# Patient Record
Sex: FEMALE
[De-identification: names, ages, dates, MRNs, and addresses within clinical notes are randomized per-mention and may not be internally consistent; named-entity substitution may affect disease eponyms.]

---

## 2024-06-13 ENCOUNTER — NON-APPOINTMENT (OUTPATIENT)
Age: 64
End: 2024-06-13

## 2024-06-13 ENCOUNTER — APPOINTMENT (OUTPATIENT)
Dept: PULMONOLOGY | Facility: CLINIC | Age: 64
End: 2024-06-13
Payer: MEDICAID

## 2024-06-13 VITALS
SYSTOLIC BLOOD PRESSURE: 120 MMHG | WEIGHT: 142 LBS | OXYGEN SATURATION: 93 % | HEART RATE: 65 BPM | HEIGHT: 62 IN | BODY MASS INDEX: 26.13 KG/M2 | DIASTOLIC BLOOD PRESSURE: 70 MMHG

## 2024-06-13 DIAGNOSIS — J44.1 CHRONIC OBSTRUCTIVE PULMONARY DISEASE WITH (ACUTE) EXACERBATION: ICD-10-CM

## 2024-06-13 DIAGNOSIS — J45.901 CHRONIC OBSTRUCTIVE PULMONARY DISEASE WITH (ACUTE) EXACERBATION: ICD-10-CM

## 2024-06-13 DIAGNOSIS — J45.40 MODERATE PERSISTENT ASTHMA, UNCOMPLICATED: ICD-10-CM

## 2024-06-13 PROBLEM — Z00.00 ENCOUNTER FOR PREVENTIVE HEALTH EXAMINATION: Status: ACTIVE | Noted: 2024-06-13

## 2024-06-13 PROCEDURE — 99204 OFFICE O/P NEW MOD 45 MIN: CPT | Mod: 25

## 2024-06-13 PROCEDURE — G2211 COMPLEX E/M VISIT ADD ON: CPT | Mod: NC,1L

## 2024-06-13 PROCEDURE — 94010 BREATHING CAPACITY TEST: CPT

## 2024-06-13 RX ORDER — FLUTICASONE PROPIONATE AND SALMETEROL 50; 100 UG/1; UG/1
100-50 POWDER RESPIRATORY (INHALATION)
Refills: 0 | Status: ACTIVE | COMMUNITY

## 2024-06-13 RX ORDER — MONTELUKAST SODIUM 10 MG/1
10 TABLET, FILM COATED ORAL
Refills: 0 | Status: ACTIVE | COMMUNITY

## 2024-06-13 RX ORDER — ALBUTEROL 90 MCG
90 AEROSOL (GRAM) INHALATION
Refills: 0 | Status: ACTIVE | COMMUNITY

## 2024-06-13 RX ORDER — BUDESONIDE AND FORMOTEROL FUMARATE DIHYDRATE 160; 4.5 UG/1; UG/1
160-4.5 AEROSOL RESPIRATORY (INHALATION) TWICE DAILY
Qty: 1 | Refills: 4 | Status: ACTIVE | COMMUNITY
Start: 2024-06-13 | End: 1900-01-01

## 2024-06-13 RX ORDER — IPRATROPIUM BROMIDE 0.5 MG/2.5ML
0.02 SOLUTION RESPIRATORY (INHALATION)
Refills: 0 | Status: ACTIVE | COMMUNITY

## 2024-06-13 NOTE — PROCEDURE
[FreeTextEntry1] : In-office spirometry performed, flow-volume loops demonstrate small airways obstruction, spirometry consistent with moderately severe obstruction.

## 2024-06-13 NOTE — HISTORY OF PRESENT ILLNESS
[Never] : never [TextBox_4] : Ms. BRADY ELLSWORTH is a 63 year woman with a medical history significant for ~  ~ presenting today to the clinic for shortness of breath and cough.  Been 10 years of SOB and cough 2010 had allergy and asthma diagnosis Got worse suddenly past 3-4 months Hasn't been able to walk up a full flight of stairs without stopping for breath for over a year. Had a CXR a few weeks ago with her PCP, reports it was normal Breathing and cough are worse first thing in the morning Was recently moved from Advair 100 to OraHealthrt 160, feels like her symptoms are worse on this med Can't walk briskly or hurry.  Occupational Hx: , 2nd hand smoke exposure   Patient relatively hypoxemic today at 93% at rest.

## 2024-06-13 NOTE — REASON FOR VISIT
[Initial] : an initial visit [Cough] : cough [Shortness of Breath] : shortness of breath [Ad Hoc ] : provided by an ad hoc

## 2024-06-13 NOTE — PHYSICAL EXAM
[No Acute Distress] : no acute distress [Normal Oropharynx] : normal oropharynx [Normal Appearance] : normal appearance [No Neck Mass] : no neck mass [Normal Rate/Rhythm] : normal rate/rhythm [Normal S1, S2] : normal s1, s2 [No Murmurs] : no murmurs [No Resp Distress] : no resp distress [No Abnormalities] : no abnormalities [Benign] : benign [Normal Gait] : normal gait [No Clubbing] : no clubbing [No Cyanosis] : no cyanosis [No Edema] : no edema [FROM] : FROM [Normal Color/ Pigmentation] : normal color/ pigmentation [No Focal Deficits] : no focal deficits [Oriented x3] : oriented x3 [Normal Affect] : normal affect [TextBox_68] : diffuse expiratory wheezing

## 2024-06-19 RX ORDER — PREDNISONE 50 MG/1
50 TABLET ORAL
Qty: 5 | Refills: 0 | Status: ACTIVE | COMMUNITY
Start: 2024-06-13 | End: 1900-01-01

## 2024-10-02 ENCOUNTER — APPOINTMENT (OUTPATIENT)
Dept: PULMONOLOGY | Facility: CLINIC | Age: 64
End: 2024-10-02
Payer: MEDICAID

## 2024-10-02 ENCOUNTER — LABORATORY RESULT (OUTPATIENT)
Age: 64
End: 2024-10-02

## 2024-10-02 ENCOUNTER — RESULT REVIEW (OUTPATIENT)
Age: 64
End: 2024-10-02

## 2024-10-02 VITALS
WEIGHT: 140 LBS | HEART RATE: 80 BPM | OXYGEN SATURATION: 95 % | RESPIRATION RATE: 14 BRPM | BODY MASS INDEX: 25.61 KG/M2 | DIASTOLIC BLOOD PRESSURE: 70 MMHG | SYSTOLIC BLOOD PRESSURE: 120 MMHG

## 2024-10-02 DIAGNOSIS — J45.40 MODERATE PERSISTENT ASTHMA, UNCOMPLICATED: ICD-10-CM

## 2024-10-02 PROCEDURE — 99213 OFFICE O/P EST LOW 20 MIN: CPT

## 2024-10-02 NOTE — HISTORY OF PRESENT ILLNESS
[Never] : never [TextBox_4] : Ms. BRADY ELLSWORTH is a 63 year woman with a medical history significant for ~  ~ presenting today to the clinic for shortness of breath and cough.  Been 10 years of SOB and cough 2010 had allergy and asthma diagnosis Got worse suddenly past 3-4 months Hasn't been able to walk up a full flight of stairs without stopping for breath for over a year. Had a CXR a few weeks ago with her PCP, reports it was normal Breathing and cough are worse first thing in the morning Was recently moved from Advair 100 to Symbicort 160, feels like her symptoms are worse on this med Can't walk briskly or hurry.  Occupational Hx: , 2nd hand smoke exposure   Patient relatively hypoxemic today at 93% at rest.  Interval history:  At our last visit, patient was started on prednisone for exacerbation given active obstruction on héctor, as well as Symbicort twice daily. Since then patient has felt great for about a month But since then has had worsening of her cough and SOB, but not nearly as bad as our first visit. Using the Symbicort twice daily every day  # Asthma Follow-up 	Nightly inhaler use:		0x/week

## 2024-10-02 NOTE — REASON FOR VISIT
[Follow-Up] : a follow-up visit [Cough] : cough [Shortness of Breath] : shortness of breath [Ad Hoc ] : provided by an ad hoc

## 2024-10-07 ENCOUNTER — APPOINTMENT (OUTPATIENT)
Dept: PULMONOLOGY | Facility: HOSPITAL | Age: 64
End: 2024-10-07

## 2024-10-07 ENCOUNTER — OUTPATIENT (OUTPATIENT)
Dept: OUTPATIENT SERVICES | Facility: HOSPITAL | Age: 64
LOS: 1 days | Discharge: HOME | End: 2024-10-07
Payer: MEDICAID

## 2024-10-07 DIAGNOSIS — R06.02 SHORTNESS OF BREATH: ICD-10-CM

## 2024-10-07 PROCEDURE — 94727 GAS DIL/WSHOT DETER LNG VOL: CPT | Mod: 26

## 2024-10-07 PROCEDURE — 94727 GAS DIL/WSHOT DETER LNG VOL: CPT

## 2024-10-07 PROCEDURE — 94729 DIFFUSING CAPACITY: CPT

## 2024-10-07 PROCEDURE — 94060 EVALUATION OF WHEEZING: CPT | Mod: 26

## 2024-10-07 PROCEDURE — 94729 DIFFUSING CAPACITY: CPT | Mod: 26

## 2024-10-07 PROCEDURE — 94664 DEMO&/EVAL PT USE INHALER: CPT

## 2024-10-07 PROCEDURE — 94070 EVALUATION OF WHEEZING: CPT

## 2024-10-08 ENCOUNTER — NON-APPOINTMENT (OUTPATIENT)
Age: 64
End: 2024-10-08

## 2024-10-08 DIAGNOSIS — R06.02 SHORTNESS OF BREATH: ICD-10-CM

## 2024-10-08 LAB
A ALTERNATA IGE QN: <0.1 KUA/L
A FUMIGATUS IGE QN: <0.1 KUA/L
BASOPHILS # BLD AUTO: 0.12 K/UL
BASOPHILS NFR BLD AUTO: 1.2 %
C ALBICANS IGE QN: <0.1 KUA/L
C HERBARUM IGE QN: <0.1 KUA/L
CAT DANDER IGE QN: <0.1 KUA/L
COMMON RAGWEED IGE QN: 1.58 KUA/L
D FARINAE IGE QN: 0.15 KUA/L
D PTERONYSS IGE QN: 0.16 KUA/L
DEPRECATED A ALTERNATA IGE RAST QL: 0 (ref 0–?)
DEPRECATED A FUMIGATUS IGE RAST QL: 0 (ref 0–?)
DEPRECATED C ALBICANS IGE RAST QL: 0
DEPRECATED C HERBARUM IGE RAST QL: 0 (ref 0–?)
DEPRECATED CAT DANDER IGE RAST QL: 0 (ref 0–?)
DEPRECATED COMMON RAGWEED IGE RAST QL: 2 (ref 0–?)
DEPRECATED D FARINAE IGE RAST QL: NORMAL (ref 0–?)
DEPRECATED D PTERONYSS IGE RAST QL: NORMAL (ref 0–?)
DEPRECATED DOG DANDER IGE RAST QL: 3 (ref 0–?)
DEPRECATED M RACEMOSUS IGE RAST QL: 0
DEPRECATED ROACH IGE RAST QL: 0 (ref 0–?)
DEPRECATED TIMOTHY IGE RAST QL: 0 (ref 0–?)
DEPRECATED WHITE OAK IGE RAST QL: 2 (ref 0–?)
DOG DANDER IGE QN: 6.98 KUA/L
EOSINOPHIL # BLD AUTO: 0.63 K/UL
EOSINOPHIL NFR BLD AUTO: 6.2 %
HCT VFR BLD CALC: 43.5 %
HGB BLD-MCNC: 14.1 G/DL
IMM GRANULOCYTES NFR BLD AUTO: 0.4 %
LYMPHOCYTES # BLD AUTO: 3.67 K/UL
LYMPHOCYTES NFR BLD AUTO: 36.2 %
M RACEMOSUS IGE QN: <0.1 KUA/L
MAN DIFF?: NORMAL
MCHC RBC-ENTMCNC: 31 PG
MCHC RBC-ENTMCNC: 32.4 G/DL
MCV RBC AUTO: 95.6 FL
MONOCYTES # BLD AUTO: 0.43 K/UL
MONOCYTES NFR BLD AUTO: 4.2 %
NEUTROPHILS # BLD AUTO: 5.24 K/UL
NEUTROPHILS NFR BLD AUTO: 51.8 %
PLATELET # BLD AUTO: 417 K/UL
PMV BLD AUTO: 0 /100 WBCS
RBC # BLD: 4.55 M/UL
RBC # FLD: 13.1 %
ROACH IGE QN: <0.1 KUA/L
TIMOTHY IGE QN: <0.1 KUA/L
TOTAL IGE SMQN RAST: 261 KU/L
WBC # FLD AUTO: 10.13 K/UL
WHITE OAK IGE QN: 0.71 KUA/L

## 2025-01-09 ENCOUNTER — APPOINTMENT (OUTPATIENT)
Dept: PULMONOLOGY | Facility: CLINIC | Age: 65
End: 2025-01-09